# Patient Record
(demographics unavailable — no encounter records)

---

## 2017-05-11 NOTE — EMERGENCY ROOM REPORT
History of Present Illness


General


Chief Complaint:  Lower Extremity Injury


Source:  Patient





Present Illness


HPI


18YOM with left ankle pain for 1-2 weeks with acute sprain, eversion yesterday.

  Wrapped with ACE.  Able to ambulate.  No previous ankle fx.  Took ibuprofen 3 

hours ago.


Allergies:  


Coded Allergies:  


     No Known Allergies (Unverified , 10/17/13)





Patient History


Past Medical History:  none


Past Surgical History:  none


Pertinent Family History:  none


Social History:  Denies: alcohol use, drug use, smoking


Immunizations:  UTD


Reviewed Nursing Documentation:  PMH: Agreed, PSxH: Agreed





Nursing Documentation-PMH


Past Medical History:  No Stated History





Review of Systems


All Other Systems:  negative except mentioned in HPI





Physical Exam





Vital Signs








  Date Time  Temp Pulse Resp B/P Pulse Ox O2 Delivery O2 Flow Rate FiO2


 


5/11/17 21:53 98.4 84 18 130/84 98 Room Air  








Sp02 EP Interpretation:  reviewed, normal


General Appearance:  normal inspection, well appearing, no apparent distress, 

alert, non-toxic


Head:  normocephalic, atraumatic


Eyes:  bilateral eye EOMI, bilateral eye PERRL


ENT:  normal ENT inspection, hearing grossly normal, normal voice


Neck:  normal inspection, full range of motion, supple, no bony tend


Respiratory:  normal inspection, lungs clear, normal breath sounds, no 

respiratory distress, no retraction, no wheezing


Cardiovascular #1:  regular rate, rhythm, no edema


Gastrointestinal:  normal inspection, normal bowel sounds, non tender, soft, no 

guarding, no hernia


Musculoskeletal:  other - left ankle.  No focal ttp.  Midl swelling to lateral 

malleoli


Neurologic:  normal inspection, alert, oriented x3, responsive, CNs III-XII nml 

as tested, speech normal


Psychiatric:  normal inspection, judgement/insight normal, mood/affect normal


Skin:  normal inspection, normal color, no rash





Medical Decision Making


Diagnostic Impression:  


 Primary Impression:  


 Left ankle sprain


 Qualified Codes:  S93.402A - Sprain of unspecified ligament of left ankle, 

initial encounter


ER Course


Left ankle sprain from acute trauma yesterday


Xray negative for acute fx, dislocation


Advised RICE, continued NSAID


PMD followup


DC home


Other X-Ray Diagnostic Results


Other X-Ray Diagnostic Results :  


   X-Ray Ordered:  left ankle


   EP Interpretation:  Yes


   Findings:  no fractures, no dislocation, no soft tissue swelling


   Number of Views:  3





Last Vital Signs








  Date Time  Temp Pulse Resp B/P Pulse Ox O2 Delivery O2 Flow Rate FiO2


 


5/11/17 21:53 98.4 84 18 130/84 98 Room Air  








Status:  improved


Disposition:  HOME, SELF-CARE


Scripts


Ibuprofen* (MOTRIN*) 600 Mg Tablet


600 MG ORAL THREE TIMES A DAY Y for ankle pain, #30 TAB 0 Refills


   Prov: DAYANARA PERDOMO M.D.         5/11/17











DAYANARA PERDOMO M.D. May 11, 2017 22:30

## 2017-05-12 NOTE — DIAGNOSTIC IMAGING REPORT
Indications:  Twisting injury to left ankle, pain



Technique: 3 views left ankle.



Findings:



Comparison: None



Lateral soft tissues mildly swollen. No fracture, dislocation, joint space

widening     , soft tissue foreign body/gas, or other acute changes are identified.



IMPRESSION:



Consider ATFL sprain



No other evidence of acute injury.

## 2018-01-19 NOTE — EMERGENCY ROOM REPORT
History of Present Illness


General


Chief Complaint:  Abdominal Pain


Source:  Patient





Present Illness


HPI


Patient presents with complaints of left lower abdominal pain


Pain is been on that for the past 2 days


Patient reports 2 episodes of diarrhea


Also nausea vomiting


Patient reports that his sister had the flu symptoms





Denies any fevers denies any rash


Denies any chest pain or shortness of breath


Pain is 4/10 sharp localized the left lower quadrant region


Allergies:  


Coded Allergies:  


     No Known Allergies (Unverified , 10/17/13)





Patient History


Past Medical History:  see triage record


Pertinent Family History:  none


Reviewed Nursing Documentation:  PMH: Agreed, PSxH: Agreed





Nursing Documentation-PMH


Past Medical History:  No Stated History





Review of Systems


All Other Systems:  negative except mentioned in HPI





Physical Exam





Vital Signs








  Date Time  Temp Pulse Resp B/P (MAP) Pulse Ox O2 Delivery O2 Flow Rate FiO2


 


1/19/18 00:26 102.0 119 16 125/77 94   


 


1/19/18 00:38      Room Air  








Sp02 EP Interpretation:  reviewed, normal


General Appearance:  well appearing, no apparent distress


Head:  normocephalic, atraumatic


Eyes:  bilateral eye PERRL, bilateral eye EOMI


ENT:  hearing grossly normal, normal pharynx, TMs + canals normal, uvula midline


Neck:  full range of motion, supple, no meningismus, no bony tend


Respiratory:  lungs clear, normal breath sounds, no rhonchi, no respiratory 

distress, no retraction, no accessory muscle use


Cardiovascular #1:  normal peripheral pulses, regular rate, rhythm, no edema, 

no gallop, no JVD, no murmur


Gastrointestinal:  normal bowel sounds, soft, no mass, no organomegaly, non-

distended, no guarding, no hernia, no pulsatile mass, no rebound, tenderness - 

Discomfort is noted on the left lower quadrant


Genitourinary:  no CVA tenderness


Musculoskeletal:  normal inspection, back normal


Neurologic:  oriented x3, responsive, CNs III-XII nml as tested, motor strength/

tone normal, sensory intact


Psychiatric:  mood/affect normal


Skin:  normal color, no rash, warm/dry, palpation normal


Lymphatic:  normal inspection, no adenopathy





Medical Decision Making


Diagnostic Impression:  


 Primary Impression:  


 Abdominal pain


ER Course


With the history exam and presentation, multiple differentials considered, 

including but not limited to appendicitis, gastritis, cholecystitis, 

diverticulitis





Patient's blood work is normal


Abdomen remained soft at this time





My suspicion for appendicitis is lower


Possibility of diverticulitis is present however patient appears well and will 

have close followup





Labs








Test


  1/19/18


01:00


 


White Blood Count


  6.4 K/UL


(4.8-10.8)


 


Red Blood Count


  5.79 M/UL


(4.70-6.10)


 


Hemoglobin


  16.5 G/DL


(14.2-18.0)


 


Hematocrit


  48.6 %


(42.0-52.0)


 


Mean Corpuscular Volume 84 FL (80-99) 


 


Mean Corpuscular Hemoglobin


  28.5 PG


(27.0-31.0)


 


Mean Corpuscular Hemoglobin


Concent 34.0 G/DL


(32.0-36.0)


 


Red Cell Distribution Width


  11.3 %


(11.6-14.8)


 


Platelet Count


  233 K/UL


(150-450)


 


Mean Platelet Volume


  8.1 FL


(6.5-10.1)


 


Neutrophils (%) (Auto)


  69.7 %


(45.0-75.0)


 


Lymphocytes (%) (Auto)


  19.5 %


(20.0-45.0)


 


Monocytes (%) (Auto)


  8.4 %


(1.0-10.0)


 


Eosinophils (%) (Auto)


  1.3 %


(0.0-3.0)


 


Basophils (%) (Auto)


  1.2 %


(0.0-2.0)


 


Sodium Level


  135 MMOL/L


(136-145)


 


Potassium Level


  3.9 MMOL/L


(3.5-5.1)


 


Chloride Level


  100 MMOL/L


()


 


Carbon Dioxide Level


  28 MMOL/L


(21-32)


 


Anion Gap


  7 mmol/L


(5-15)


 


Blood Urea Nitrogen 8 mg/dL (7-18) 


 


Creatinine


  1.3 MG/DL


(0.55-1.30)


 


Estimat Glomerular Filtration


Rate > 60 mL/min


(>60)


 


Glucose Level


  96 MG/DL


()


 


Calcium Level


  9.9 MG/DL


(8.5-10.1)


 


Total Bilirubin


  0.5 MG/DL


(0.2-1.0)


 


Aspartate Amino Transf


(AST/SGOT) 19 U/L (15-37) 


 


 


Alanine Aminotransferase


(ALT/SGPT) 19 U/L (12-78) 


 


 


Alkaline Phosphatase


  82 U/L


()


 


Total Protein


  9.0 G/DL


(6.4-8.2)


 


Albumin


  4.3 G/DL


(3.4-5.0)


 


Globulin 4.7 g/dL 


 


Albumin/Globulin Ratio 0.9 (1.0-2.7) 


 


Lipase


  155 U/L


()











Last Vital Signs








  Date Time  Temp Pulse Resp B/P (MAP) Pulse Ox O2 Delivery O2 Flow Rate FiO2


 


1/19/18 00:38 102.0 119 16 125/77 94 Room Air  








Status:  improved


Disposition:  HOME, SELF-CARE


Condition:  Improved


Scripts


Dicyclomine Hcl* (BENTYL*) 10 Mg Capsule


10 MG ORAL TID, #10 CAP


   Prov: KAREN VAZ D.O.         1/19/18 


Ondansetron Odt* (ZOFRAN ODT*) 4 Mg Tab.rapdis


4 MG ORAL Q6H Y for Nausea & Vomiting, #12 TAB 0 Refills


   Prov: KAREN VAZ D.O.         1/19/18


Referrals:  


NOT CHOSEN [facility]/MD,REFERRING (PCP)





Additional Instructions:  


Patient is provided with the discharge instructions notified to follow up with 

primary doctor in the next 2-3 days otherwise return to the er with any 

worsening symptoms.


Please note that this report is being documented using DRAGON technology.  This 

can lead to erroneous entry secondary to incorrect interpretation by the 

dictating instrument.











KAREN VAZ D.O. Jan 19, 2018 01:20